# Patient Record
Sex: FEMALE | Race: WHITE | URBAN - METROPOLITAN AREA
[De-identification: names, ages, dates, MRNs, and addresses within clinical notes are randomized per-mention and may not be internally consistent; named-entity substitution may affect disease eponyms.]

---

## 2023-09-11 ENCOUNTER — APPOINTMENT (RX ONLY)
Dept: URBAN - METROPOLITAN AREA CLINIC 146 | Facility: CLINIC | Age: 23
Setting detail: DERMATOLOGY
End: 2023-09-11

## 2023-09-11 DIAGNOSIS — L81.4 OTHER MELANIN HYPERPIGMENTATION: ICD-10-CM

## 2023-09-11 DIAGNOSIS — L0391 CELLULITIS AND ABSCESS OF UNSPECIFIED SITES: ICD-10-CM

## 2023-09-11 DIAGNOSIS — Z12.83 ENCOUNTER FOR SCREENING FOR MALIGNANT NEOPLASM OF SKIN: ICD-10-CM

## 2023-09-11 DIAGNOSIS — L0390 CELLULITIS AND ABSCESS OF UNSPECIFIED SITES: ICD-10-CM

## 2023-09-11 DIAGNOSIS — D18.0 HEMANGIOMA: ICD-10-CM

## 2023-09-11 DIAGNOSIS — L82.1 OTHER SEBORRHEIC KERATOSIS: ICD-10-CM

## 2023-09-11 PROBLEM — D18.01 HEMANGIOMA OF SKIN AND SUBCUTANEOUS TISSUE: Status: ACTIVE | Noted: 2023-09-11

## 2023-09-11 PROBLEM — L02.412 CUTANEOUS ABSCESS OF LEFT AXILLA: Status: ACTIVE | Noted: 2023-09-11

## 2023-09-11 PROCEDURE — ? INTRALESIONAL KENALOG

## 2023-09-11 PROCEDURE — ? COUNSELING

## 2023-09-11 PROCEDURE — ? RECOMMENDATIONS

## 2023-09-11 PROCEDURE — 99203 OFFICE O/P NEW LOW 30 MIN: CPT | Mod: 25

## 2023-09-11 PROCEDURE — ? PRESCRIPTION

## 2023-09-11 PROCEDURE — ? PRESCRIPTION MEDICATION MANAGEMENT

## 2023-09-11 PROCEDURE — 11900 INJECT SKIN LESIONS </W 7: CPT

## 2023-09-11 RX ORDER — AMOXICILLIN AND CLAVULANATE POTASSIUM 500; 125 MG/1; 1/1
TABLET, FILM COATED ORAL
Qty: 14 | Refills: 0 | Status: ERX | COMMUNITY
Start: 2023-09-11

## 2023-09-11 RX ADMIN — AMOXICILLIN AND CLAVULANATE POTASSIUM: 500; 125 TABLET, FILM COATED ORAL at 00:00

## 2023-09-11 ASSESSMENT — LOCATION ZONE DERM
LOCATION ZONE: ARM
LOCATION ZONE: TRUNK
LOCATION ZONE: AXILLAE

## 2023-09-11 ASSESSMENT — LOCATION SIMPLE DESCRIPTION DERM
LOCATION SIMPLE: CHEST
LOCATION SIMPLE: UPPER BACK
LOCATION SIMPLE: RIGHT UPPER BACK
LOCATION SIMPLE: LEFT SHOULDER
LOCATION SIMPLE: ABDOMEN
LOCATION SIMPLE: RIGHT SHOULDER
LOCATION SIMPLE: LEFT UPPER BACK
LOCATION SIMPLE: LEFT AXILLARY VAULT

## 2023-09-11 ASSESSMENT — LOCATION DETAILED DESCRIPTION DERM
LOCATION DETAILED: SUPERIOR THORACIC SPINE
LOCATION DETAILED: SUBXIPHOID
LOCATION DETAILED: RIGHT MEDIAL SUPERIOR CHEST
LOCATION DETAILED: LEFT MEDIAL SUPERIOR CHEST
LOCATION DETAILED: RIGHT SUPERIOR UPPER BACK
LOCATION DETAILED: LEFT AXILLARY VAULT
LOCATION DETAILED: LEFT INFERIOR MEDIAL UPPER BACK
LOCATION DETAILED: LEFT SUPERIOR UPPER BACK
LOCATION DETAILED: RIGHT INFERIOR MEDIAL UPPER BACK
LOCATION DETAILED: UPPER STERNUM
LOCATION DETAILED: RIGHT ANTERIOR SHOULDER
LOCATION DETAILED: EPIGASTRIC SKIN
LOCATION DETAILED: LEFT ANTERIOR SHOULDER

## 2023-09-11 NOTE — PROCEDURE: PRESCRIPTION MEDICATION MANAGEMENT
Render In Strict Bullet Format?: No
Detail Level: Zone
Initiate Treatment: Augmentin 500 mg-125 mg tablet \\nQuantity: 14.0 Tablet\\nSig: Take 1 po BID for 7 days
Plan: F/U in 1 Week

## 2023-09-11 NOTE — PROCEDURE: INTRALESIONAL KENALOG
Lot # (Optional): [id number]
How Many Mls Were Removed From The 40 Mg/Ml (5ml) Vial When Preparing The Injectable Solution?: 0
Consent: The risks of atrophy were reviewed with the patient.
Ndc# For Kenalog Only: 4637-5426-72
Size Of Lesion (Optional): 0.5
Kenalog Preparation: Kenalog
Administered By (Optional): MACARIO
Require Ndc Code?: No
Concentration Of Solution Injected (Mg/Ml): 10.0
Expiration Date (Optional): 9/2024
Show Inventory Tab: Hide
Medical Necessity Clause: This procedure was medically necessary because the lesions that were treated were:
Total Volume Injected (Ccs- Only Use Numbers And Decimals): .2
Kenalog Type Of Vial: Multiple Dose
Validate Note Data When Using Inventory: Yes
Detail Level: Detailed

## 2023-09-18 ENCOUNTER — APPOINTMENT (RX ONLY)
Dept: URBAN - METROPOLITAN AREA CLINIC 146 | Facility: CLINIC | Age: 23
Setting detail: DERMATOLOGY
End: 2023-09-18

## 2023-09-18 DIAGNOSIS — L0390 CELLULITIS AND ABSCESS OF UNSPECIFIED SITES: ICD-10-CM | Status: RESOLVED

## 2023-09-18 DIAGNOSIS — L0391 CELLULITIS AND ABSCESS OF UNSPECIFIED SITES: ICD-10-CM | Status: RESOLVED

## 2023-09-18 PROBLEM — L02.412 CUTANEOUS ABSCESS OF LEFT AXILLA: Status: ACTIVE | Noted: 2023-09-18

## 2023-09-18 PROCEDURE — ? PRESCRIPTION MEDICATION MANAGEMENT

## 2023-09-18 PROCEDURE — ? COUNSELING

## 2023-09-18 PROCEDURE — 99213 OFFICE O/P EST LOW 20 MIN: CPT

## 2023-09-18 ASSESSMENT — LOCATION DETAILED DESCRIPTION DERM: LOCATION DETAILED: LEFT AXILLARY VAULT

## 2023-09-18 ASSESSMENT — LOCATION SIMPLE DESCRIPTION DERM: LOCATION SIMPLE: LEFT AXILLARY VAULT

## 2023-09-18 ASSESSMENT — LOCATION ZONE DERM: LOCATION ZONE: AXILLAE

## 2023-09-18 NOTE — PROCEDURE: PRESCRIPTION MEDICATION MANAGEMENT
Render In Strict Bullet Format?: No
Detail Level: Zone
Initiate Treatment: Sulfur soap QD
Plan: F/U in 1 Week
Continue Regimen: Augmentin 500 mg-125 mg tablet: Take 1 po BID for 7 days

## 2023-09-22 RX ORDER — AMOXICILLIN AND CLAVULANATE POTASSIUM 500; 125 MG/1; 1/1
TABLET, FILM COATED ORAL
Qty: 14 | Refills: 0 | Status: ERX

## 2023-10-04 ENCOUNTER — APPOINTMENT (RX ONLY)
Dept: URBAN - METROPOLITAN AREA CLINIC 146 | Facility: CLINIC | Age: 23
Setting detail: DERMATOLOGY
End: 2023-10-04

## 2023-10-04 DIAGNOSIS — L0391 CELLULITIS AND ABSCESS OF UNSPECIFIED SITES: ICD-10-CM

## 2023-10-04 DIAGNOSIS — L73.9 FOLLICULAR DISORDER, UNSPECIFIED: ICD-10-CM

## 2023-10-04 DIAGNOSIS — L0390 CELLULITIS AND ABSCESS OF UNSPECIFIED SITES: ICD-10-CM

## 2023-10-04 DIAGNOSIS — L08.9 LOCAL INFECTION OF THE SKIN AND SUBCUTANEOUS TISSUE, UNSPECIFIED: ICD-10-CM

## 2023-10-04 DIAGNOSIS — L738 OTHER SPECIFIED DISEASES OF HAIR AND HAIR FOLLICLES: ICD-10-CM

## 2023-10-04 DIAGNOSIS — L663 OTHER SPECIFIED DISEASES OF HAIR AND HAIR FOLLICLES: ICD-10-CM

## 2023-10-04 PROBLEM — L02.424 FURUNCLE OF LEFT UPPER LIMB: Status: ACTIVE | Noted: 2023-10-04

## 2023-10-04 PROBLEM — L02.412 CUTANEOUS ABSCESS OF LEFT AXILLA: Status: ACTIVE | Noted: 2023-10-04

## 2023-10-04 PROBLEM — L02.422 FURUNCLE OF LEFT AXILLA: Status: ACTIVE | Noted: 2023-10-04

## 2023-10-04 PROCEDURE — ? PRESCRIPTION

## 2023-10-04 PROCEDURE — 99213 OFFICE O/P EST LOW 20 MIN: CPT | Mod: 25

## 2023-10-04 PROCEDURE — ? COUNSELING

## 2023-10-04 PROCEDURE — ? ORDER TESTS

## 2023-10-04 PROCEDURE — ? INCISION AND DRAINAGE

## 2023-10-04 PROCEDURE — ? PRESCRIPTION MEDICATION MANAGEMENT

## 2023-10-04 PROCEDURE — ? INTRALESIONAL KENALOG

## 2023-10-04 PROCEDURE — 10060 I&D ABSCESS SIMPLE/SINGLE: CPT

## 2023-10-04 PROCEDURE — 11900 INJECT SKIN LESIONS </W 7: CPT

## 2023-10-04 RX ORDER — CLINDAMYCIN PHOSPHATE 10 MG/ML
SOLUTION TOPICAL QD
Qty: 60 | Refills: 3 | Status: ERX | COMMUNITY
Start: 2023-10-04

## 2023-10-04 RX ORDER — BENZOYL PEROXIDE 50 MG/ML
LIQUID TOPICAL
Qty: 237 | Refills: 6 | Status: ERX | COMMUNITY
Start: 2023-10-04

## 2023-10-04 RX ADMIN — BENZOYL PEROXIDE: 50 LIQUID TOPICAL at 00:00

## 2023-10-04 RX ADMIN — CLINDAMYCIN PHOSPHATE: 10 SOLUTION TOPICAL at 00:00

## 2023-10-04 ASSESSMENT — LOCATION ZONE DERM
LOCATION ZONE: AXILLAE
LOCATION ZONE: ARM

## 2023-10-04 ASSESSMENT — LOCATION DETAILED DESCRIPTION DERM
LOCATION DETAILED: LEFT ANTERIOR SHOULDER
LOCATION DETAILED: RIGHT AXILLARY VAULT
LOCATION DETAILED: LEFT AXILLARY VAULT

## 2023-10-04 ASSESSMENT — LOCATION SIMPLE DESCRIPTION DERM
LOCATION SIMPLE: RIGHT AXILLARY VAULT
LOCATION SIMPLE: LEFT AXILLARY VAULT
LOCATION SIMPLE: LEFT SHOULDER

## 2023-10-04 NOTE — PROCEDURE: INTRALESIONAL KENALOG
Detail Level: Simple
Concentration Of Solution Injected (Mg/Ml): 5.0
Consent: The risks of atrophy were reviewed with the patient.
Bill For Wasted Drug?: no
How Many Mls Were Removed From The 10 Mg/Ml (5ml) Vial When Preparing The Injectable Solution?: 0.5
Medical Necessity Clause: This procedure was medically necessary because the lesions that were treated were:
Show Inventory Tab: Hide
X Size Of Lesion In Cm (Optional): 0
Total Volume Injected (Ccs- Only Use Numbers And Decimals): 1.0
Administered By (Optional): DONALD
Kenalog Preparation: Kenalog
Treatment Number (Optional): 2
Ndc# For Kenalog Only: 9346-3215-97
Kenalog Type Of Vial: Multiple Dose
Which Kenalog Vial Was Used?: Kenalog 10 mg/ml (5 ml vial)
Lot # (Optional): [id number]
Expiration Date (Optional): 9/24
Validate Note Data When Using Inventory: Yes

## 2023-10-04 NOTE — PROCEDURE: ORDER TESTS
Lab Facility: [id number]
Performing Laboratory: -[id number]
Bill For Surgical Tray: no
Billing Type: [employer]
Expected Date Of Service: 10/04/2023

## 2023-10-04 NOTE — PROCEDURE: PRESCRIPTION MEDICATION MANAGEMENT
Initiate Treatment: Clindamycin solution daily after the shower\\nBP 5% wash daily\\nWash with hibaclens
Detail Level: Zone
Render In Strict Bullet Format?: No

## 2023-10-17 ENCOUNTER — APPOINTMENT (RX ONLY)
Dept: URBAN - METROPOLITAN AREA CLINIC 146 | Facility: CLINIC | Age: 23
Setting detail: DERMATOLOGY
End: 2023-10-17

## 2023-10-17 DIAGNOSIS — L73.9 FOLLICULAR DISORDER, UNSPECIFIED: ICD-10-CM | Status: RESOLVING

## 2023-10-17 DIAGNOSIS — L0391 CELLULITIS AND ABSCESS OF UNSPECIFIED SITES: ICD-10-CM

## 2023-10-17 DIAGNOSIS — L738 OTHER SPECIFIED DISEASES OF HAIR AND HAIR FOLLICLES: ICD-10-CM | Status: RESOLVING

## 2023-10-17 DIAGNOSIS — L0390 CELLULITIS AND ABSCESS OF UNSPECIFIED SITES: ICD-10-CM

## 2023-10-17 DIAGNOSIS — L663 OTHER SPECIFIED DISEASES OF HAIR AND HAIR FOLLICLES: ICD-10-CM | Status: RESOLVING

## 2023-10-17 PROBLEM — L02.422 FURUNCLE OF LEFT AXILLA: Status: ACTIVE | Noted: 2023-10-17

## 2023-10-17 PROBLEM — L02.411 CUTANEOUS ABSCESS OF RIGHT AXILLA: Status: ACTIVE | Noted: 2023-10-17

## 2023-10-17 PROBLEM — L02.413 CUTANEOUS ABSCESS OF RIGHT UPPER LIMB: Status: ACTIVE | Noted: 2023-10-17

## 2023-10-17 PROBLEM — L02.412 CUTANEOUS ABSCESS OF LEFT AXILLA: Status: ACTIVE | Noted: 2023-10-17

## 2023-10-17 PROCEDURE — ? INCISION AND DRAINAGE

## 2023-10-17 PROCEDURE — ? INTRALESIONAL KENALOG

## 2023-10-17 PROCEDURE — ? COUNSELING

## 2023-10-17 PROCEDURE — 99212 OFFICE O/P EST SF 10 MIN: CPT | Mod: 25

## 2023-10-17 PROCEDURE — 10060 I&D ABSCESS SIMPLE/SINGLE: CPT

## 2023-10-17 PROCEDURE — 11900 INJECT SKIN LESIONS </W 7: CPT

## 2023-10-17 ASSESSMENT — LOCATION SIMPLE DESCRIPTION DERM
LOCATION SIMPLE: RIGHT UPPER ARM
LOCATION SIMPLE: LEFT AXILLARY VAULT
LOCATION SIMPLE: RIGHT AXILLARY VAULT

## 2023-10-17 ASSESSMENT — LOCATION DETAILED DESCRIPTION DERM
LOCATION DETAILED: RIGHT ANTERIOR PROXIMAL UPPER ARM
LOCATION DETAILED: LEFT AXILLARY VAULT
LOCATION DETAILED: RIGHT AXILLARY VAULT

## 2023-10-17 ASSESSMENT — LOCATION ZONE DERM
LOCATION ZONE: AXILLAE
LOCATION ZONE: ARM

## 2023-10-17 NOTE — PROCEDURE: INTRALESIONAL KENALOG
Validate Note Data When Using Inventory: Yes
Require Ndc Code?: No
Kenalog Type Of Vial: Multiple Dose
Lot # (Optional): [id number]
Ndc# For Kenalog Only: 7018-3918-72
Kenalog Preparation: Kenalog
Which Kenalog Vial Was Used?: Kenalog 10 mg/ml (5 ml vial)
Medical Necessity Clause: This procedure was medically necessary because the lesions that were treated were:
How Many Mls Were Removed From The 40 Mg/Ml (5ml) Vial When Preparing The Injectable Solution?: 0
Total Volume Injected (Ccs- Only Use Numbers And Decimals): 1.0
Show Inventory Tab: Hide
Treatment Number (Optional): 1
Concentration Of Solution Injected (Mg/Ml): 5.0
Consent: The risks of atrophy were reviewed with the patient.
Detail Level: Simple
Expiration Date (Optional): 10/25

## 2023-10-20 ENCOUNTER — RX ONLY (OUTPATIENT)
Age: 23
Setting detail: RX ONLY
End: 2023-10-20

## 2023-10-20 RX ORDER — DOXYCYCLINE HYCLATE 100 MG/1
CAPSULE, GELATIN COATED ORAL BID
Qty: 14 | Refills: 0 | Status: ERX | COMMUNITY
Start: 2023-10-20

## 2023-10-24 ENCOUNTER — APPOINTMENT (RX ONLY)
Dept: URBAN - METROPOLITAN AREA CLINIC 146 | Facility: CLINIC | Age: 23
Setting detail: DERMATOLOGY
End: 2023-10-24

## 2023-10-24 DIAGNOSIS — L08.9 LOCAL INFECTION OF THE SKIN AND SUBCUTANEOUS TISSUE, UNSPECIFIED: ICD-10-CM

## 2023-10-24 PROCEDURE — ? PRESCRIPTION MEDICATION MANAGEMENT

## 2023-10-24 PROCEDURE — ? COUNSELING

## 2023-10-24 PROCEDURE — ? PRESCRIPTION

## 2023-10-24 PROCEDURE — 99212 OFFICE O/P EST SF 10 MIN: CPT | Mod: 24

## 2023-10-24 PROCEDURE — ? ADDITIONAL NOTES

## 2023-10-24 PROCEDURE — ? ORDER TESTS

## 2023-10-24 RX ORDER — MUPIROCIN 20 MG/G
OINTMENT TOPICAL
Qty: 22 | Refills: 1 | Status: ERX | COMMUNITY
Start: 2023-10-24

## 2023-10-24 RX ADMIN — MUPIROCIN: 20 OINTMENT TOPICAL at 00:00

## 2023-10-24 ASSESSMENT — LOCATION ZONE DERM: LOCATION ZONE: AXILLAE

## 2023-10-24 ASSESSMENT — LOCATION DETAILED DESCRIPTION DERM: LOCATION DETAILED: RIGHT AXILLARY VAULT

## 2023-10-24 ASSESSMENT — LOCATION SIMPLE DESCRIPTION DERM: LOCATION SIMPLE: RIGHT AXILLARY VAULT

## 2023-10-24 NOTE — PROCEDURE: ORDER TESTS
Expected Date Of Service: 10/24/2023
Billing Type: [employer]
Performing Laboratory: -[id number]
Bill For Surgical Tray: no

## 2023-10-24 NOTE — PROCEDURE: PRESCRIPTION MEDICATION MANAGEMENT
Detail Level: Zone
Initiate Treatment: Hibaclens Wash once a week\\nMupirocin ointment
Continue Regimen: Clindamycin solution\\nBP cleanser\\nDoxycycline
Render In Strict Bullet Format?: No

## 2023-10-24 NOTE — PROCEDURE: ADDITIONAL NOTES
Render Risk Assessment In Note?: no
Additional Notes: Discussed different treatment options between excision, Accutane and injections
Detail Level: Simple